# Patient Record
Sex: FEMALE | Race: WHITE | URBAN - METROPOLITAN AREA
[De-identification: names, ages, dates, MRNs, and addresses within clinical notes are randomized per-mention and may not be internally consistent; named-entity substitution may affect disease eponyms.]

---

## 2021-12-08 ENCOUNTER — OFFICE VISIT (OUTPATIENT)
Dept: URBAN - METROPOLITAN AREA CLINIC 54 | Facility: CLINIC | Age: 76
End: 2021-12-08
Payer: MEDICARE

## 2021-12-08 DIAGNOSIS — Z96.1 PRESENCE OF INTRAOCULAR LENS: ICD-10-CM

## 2021-12-08 DIAGNOSIS — H17.9 CORNEAL SCAR: ICD-10-CM

## 2021-12-08 DIAGNOSIS — E11.9 TYPE 2 DIABETES MELLITUS WITHOUT COMPLICATIONS: ICD-10-CM

## 2021-12-08 DIAGNOSIS — H35.372 PUCKERING OF MACULA, LEFT EYE: Primary | ICD-10-CM

## 2021-12-08 PROCEDURE — 92134 CPTRZ OPH DX IMG PST SGM RTA: CPT | Performed by: OPHTHALMOLOGY

## 2021-12-08 PROCEDURE — 99204 OFFICE O/P NEW MOD 45 MIN: CPT | Performed by: OPHTHALMOLOGY

## 2021-12-08 ASSESSMENT — INTRAOCULAR PRESSURE
OD: 15
OS: 19

## 2021-12-08 NOTE — IMPRESSION/PLAN
Impression: Puckering of macula, left eye: H35.372. OCT OU - no IRF/SRF, ERM OS  / 382  Plan: The patient has an ERM in the setting of sig corneal haze and thus we will observe for now. Patient knows to call with any decrease in vision and increase in metamorphopsia. Likely would benefit from [facility] repair if/when cornea is better. 

12m OCT OU

## 2021-12-08 NOTE — IMPRESSION/PLAN
Impression: Type 2 diabetes mellitus without complications: Y95.2. Plan: Thankfully, no ophthalmic manifestations at this time. BS/BP control emphasized with patient.
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
